# Patient Record
Sex: MALE | Race: WHITE | Employment: FULL TIME | URBAN - NONMETROPOLITAN AREA
[De-identification: names, ages, dates, MRNs, and addresses within clinical notes are randomized per-mention and may not be internally consistent; named-entity substitution may affect disease eponyms.]

---

## 2022-10-22 ENCOUNTER — HOSPITAL ENCOUNTER (EMERGENCY)
Age: 42
Discharge: ANOTHER HOSPITAL | End: 2022-10-22
Attending: EMERGENCY MEDICINE | Admitting: EMERGENCY MEDICINE

## 2022-10-22 ENCOUNTER — APPOINTMENT (OUTPATIENT)
Dept: GENERAL RADIOLOGY | Age: 42
End: 2022-10-22
Attending: EMERGENCY MEDICINE

## 2022-10-22 VITALS
SYSTOLIC BLOOD PRESSURE: 153 MMHG | RESPIRATION RATE: 18 BRPM | OXYGEN SATURATION: 99 % | HEART RATE: 95 BPM | WEIGHT: 190 LBS | HEIGHT: 68 IN | DIASTOLIC BLOOD PRESSURE: 83 MMHG | BODY MASS INDEX: 28.79 KG/M2

## 2022-10-22 DIAGNOSIS — S82.142A TIBIAL PLATEAU FRACTURE, LEFT, CLOSED, INITIAL ENCOUNTER: ICD-10-CM
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
## 2022-10-22 DIAGNOSIS — S83.105A DISLOCATION OF LEFT KNEE, INITIAL ENCOUNTER: ICD-10-CM

## 2022-10-22 LAB
ANION GAP SERPL CALC-SCNC: 10 MMOL/L (ref 3–18)
APTT PPP: 23.3 SEC (ref 23–36.4)
BASOPHILS # BLD: 0.1 K/UL (ref 0–0.1)
BASOPHILS NFR BLD: 1 % (ref 0–2)
BUN SERPL-MCNC: 15 MG/DL (ref 7–18)
BUN/CREAT SERPL: 14 (ref 12–20)
CA-I BLD-MCNC: 8.9 MG/DL (ref 8.5–10.1)
CHLORIDE SERPL-SCNC: 104 MMOL/L (ref 100–111)
CO2 SERPL-SCNC: 27 MMOL/L (ref 21–32)
CREAT SERPL-MCNC: 1.09 MG/DL (ref 0.6–1.3)
DIFFERENTIAL METHOD BLD: ABNORMAL
EOSINOPHIL # BLD: 0.2 K/UL (ref 0–0.4)
EOSINOPHIL NFR BLD: 2 % (ref 0–5)
ERYTHROCYTE [DISTWIDTH] IN BLOOD BY AUTOMATED COUNT: 13.3 % (ref 11.6–14.5)
GLUCOSE SERPL-MCNC: 109 MG/DL (ref 74–99)
HCT VFR BLD AUTO: 45.4 % (ref 36–48)
HGB BLD-MCNC: 15.1 G/DL (ref 13–16)
IMM GRANULOCYTES # BLD AUTO: 0.1 K/UL (ref 0–0.04)
IMM GRANULOCYTES NFR BLD AUTO: 0 % (ref 0–0.5)
INR PPP: 0.9 (ref 0.8–1.2)
LYMPHOCYTES # BLD: 1.3 K/UL (ref 0.9–3.6)
LYMPHOCYTES NFR BLD: 10 % (ref 21–52)
MCH RBC QN AUTO: 30 PG (ref 24–34)
MCHC RBC AUTO-ENTMCNC: 33.3 G/DL (ref 31–37)
MCV RBC AUTO: 90.3 FL (ref 78–100)
MONOCYTES # BLD: 0.8 K/UL (ref 0.05–1.2)
MONOCYTES NFR BLD: 6 % (ref 3–10)
NEUTS SEG # BLD: 11.3 K/UL (ref 1.8–8)
NEUTS SEG NFR BLD: 81 % (ref 40–73)
NRBC # BLD: 0 K/UL (ref 0–0.01)
NRBC BLD-RTO: 0 PER 100 WBC
PLATELET # BLD AUTO: 247 K/UL (ref 135–420)
PMV BLD AUTO: 10 FL (ref 9.2–11.8)
POTASSIUM SERPL-SCNC: 4.3 MMOL/L (ref 3.5–5.5)
PROTHROMBIN TIME: 13 SEC (ref 11.5–15.2)
RBC # BLD AUTO: 5.03 M/UL (ref 4.35–5.65)
SODIUM SERPL-SCNC: 141 MMOL/L (ref 136–145)
THERAPEUTIC RANGE,PTTT: NORMAL SEC (ref 82–109)
WBC # BLD AUTO: 13.7 K/UL (ref 4.6–13.2)

## 2022-10-22 PROCEDURE — 73562 X-RAY EXAM OF KNEE 3: CPT

## 2022-10-22 PROCEDURE — 71045 X-RAY EXAM CHEST 1 VIEW: CPT

## 2022-10-22 PROCEDURE — 85025 COMPLETE CBC W/AUTO DIFF WBC: CPT

## 2022-10-22 PROCEDURE — 96376 TX/PRO/DX INJ SAME DRUG ADON: CPT | Performed by: EMERGENCY MEDICINE

## 2022-10-22 PROCEDURE — 74011250636 HC RX REV CODE- 250/636: Performed by: EMERGENCY MEDICINE

## 2022-10-22 PROCEDURE — 72170 X-RAY EXAM OF PELVIS: CPT

## 2022-10-22 PROCEDURE — 72040 X-RAY EXAM NECK SPINE 2-3 VW: CPT

## 2022-10-22 PROCEDURE — 85610 PROTHROMBIN TIME: CPT

## 2022-10-22 PROCEDURE — 73560 X-RAY EXAM OF KNEE 1 OR 2: CPT

## 2022-10-22 PROCEDURE — 36415 COLL VENOUS BLD VENIPUNCTURE: CPT

## 2022-10-22 PROCEDURE — 80048 BASIC METABOLIC PNL TOTAL CA: CPT

## 2022-10-22 PROCEDURE — 75810000053 HC SPLINT APPLICATION

## 2022-10-22 PROCEDURE — 99285 EMERGENCY DEPT VISIT HI MDM: CPT | Performed by: EMERGENCY MEDICINE

## 2022-10-22 PROCEDURE — 96361 HYDRATE IV INFUSION ADD-ON: CPT | Performed by: EMERGENCY MEDICINE

## 2022-10-22 PROCEDURE — 96374 THER/PROPH/DIAG INJ IV PUSH: CPT | Performed by: EMERGENCY MEDICINE

## 2022-10-22 PROCEDURE — 96375 TX/PRO/DX INJ NEW DRUG ADDON: CPT | Performed by: EMERGENCY MEDICINE

## 2022-10-22 PROCEDURE — 85730 THROMBOPLASTIN TIME PARTIAL: CPT

## 2022-10-22 RX ORDER — HYDROMORPHONE HYDROCHLORIDE 1 MG/ML
2 INJECTION, SOLUTION INTRAMUSCULAR; INTRAVENOUS; SUBCUTANEOUS
Status: COMPLETED | OUTPATIENT
Start: 2022-10-22 | End: 2022-10-22

## 2022-10-22 RX ORDER — ONDANSETRON 2 MG/ML
4 INJECTION INTRAMUSCULAR; INTRAVENOUS
Status: COMPLETED | OUTPATIENT
Start: 2022-10-22 | End: 2022-10-22

## 2022-10-22 RX ORDER — HYDROMORPHONE HYDROCHLORIDE 1 MG/ML
2 INJECTION, SOLUTION INTRAMUSCULAR; INTRAVENOUS; SUBCUTANEOUS ONCE
Status: COMPLETED | OUTPATIENT
Start: 2022-10-22 | End: 2022-10-22

## 2022-10-22 RX ADMIN — SODIUM CHLORIDE 1000 ML: 9 INJECTION, SOLUTION INTRAVENOUS at 12:06

## 2022-10-22 RX ADMIN — HYDROMORPHONE HYDROCHLORIDE 2 MG: 1 INJECTION, SOLUTION INTRAMUSCULAR; INTRAVENOUS; SUBCUTANEOUS at 12:02

## 2022-10-22 RX ADMIN — HYDROMORPHONE HYDROCHLORIDE 2 MG: 1 INJECTION, SOLUTION INTRAMUSCULAR; INTRAVENOUS; SUBCUTANEOUS at 12:28

## 2022-10-22 RX ADMIN — ONDANSETRON 4 MG: 2 INJECTION INTRAMUSCULAR; INTRAVENOUS at 12:02

## 2022-10-22 NOTE — PROGRESS NOTES
Pt states pain is 4/10 at this time now that MD has splinted and stablized knee. Pt's mother and son is present at bedside. Report given to Wanda Grossman at [place] [id number]. Received call from transport center that [facility] is in air.

## 2022-10-22 NOTE — ED PROVIDER NOTES
EMERGENCY DEPARTMENT HISTORY AND PHYSICAL EXAM      Date: 10/22/2022  Patient Name: Cheyenne Loyd    History of Presenting Illness     Chief Complaint   Patient presents with    Knee Injury       History Provided By: Patient and EMS    HPI: Corey Dumont, 38 y.o. male with no significant past medical history, presents to ED, brought in by EMS after a fall while hunting just prior to ED arrival.  Patient fell off 8 foot embankment into a creek. He was unable to to stand. There is no head injury or neck injury. EMS report obvious deformity to the left knee, present but decreased pulses at scene. Patient complaining of severe pain of the knee at arrival.  He denies alcohol or drugs. There are no other complaints, changes, or physical findings at this time. PCP: Mike Webb MD    Current Facility-Administered Medications   Medication Dose Route Frequency Provider Last Rate Last Admin    sodium chloride 0.9 % bolus infusion 1,000 mL  1,000 mL IntraVENous ONCE Brian Lim MD 1,000 mL/hr at 10/22/22 1206 1,000 mL at 10/22/22 1206       Past History   Past Medical History:  History reviewed. No pertinent past medical history. Past Surgical History:  History reviewed. No pertinent surgical history. Family History:  History reviewed. No pertinent family history. Social History:  Social History     Tobacco Use    Smoking status: Some Days     Types: Cigarettes   Substance Use Topics    Alcohol use: Yes     Alcohol/week: 14.0 standard drinks     Types: 14 Cans of beer per week       Allergies:  No Known Allergies  Review of Systems   Review of Systems   All other systems reviewed and are negative. Physical Exam   Physical Exam  Vitals and nursing note reviewed. Constitutional:       General: He is not in acute distress. Appearance: He is well-developed. He is not diaphoretic. Comments: Patient appears in pain, arrived with c-collar on and long spine board.   Obvious deformity 20 which is flexed and left foot laterally deviated. HENT:      Head: Normocephalic and atraumatic. No right periorbital erythema or left periorbital erythema. Jaw: No trismus. Right Ear: External ear normal. No drainage or swelling. Tympanic membrane is not perforated, erythematous or bulging. Left Ear: External ear normal. No drainage or swelling. Tympanic membrane is not perforated, erythematous or bulging. Nose: Nose normal. No mucosal edema or rhinorrhea. Right Sinus: No maxillary sinus tenderness or frontal sinus tenderness. Left Sinus: No maxillary sinus tenderness or frontal sinus tenderness. Mouth/Throat:      Mouth: No oral lesions. Dentition: No dental abscesses. Pharynx: Uvula midline. No oropharyngeal exudate, posterior oropharyngeal erythema or uvula swelling. Tonsils: No tonsillar abscesses. Eyes:      General: No scleral icterus. Right eye: No discharge. Left eye: No discharge. Extraocular Movements: Extraocular movements intact. Conjunctiva/sclera: Conjunctivae normal.      Pupils: Pupils are equal, round, and reactive to light. Cardiovascular:      Rate and Rhythm: Normal rate and regular rhythm. Heart sounds: Normal heart sounds. No murmur heard. No friction rub. No gallop. Pulmonary:      Effort: Pulmonary effort is normal. No tachypnea, accessory muscle usage or respiratory distress. Breath sounds: No decreased breath sounds, wheezing, rhonchi or rales. Abdominal:      General: Bowel sounds are normal. There is no distension. Palpations: Abdomen is soft. Tenderness: There is no abdominal tenderness. Musculoskeletal:         General: No tenderness. Normal range of motion. Cervical back: Normal range of motion and neck supple. Comments: Knee with obvious deformity, seems laterally dislocated. There is minimal swelling. Patient is in position of comfort with flexed knee.   Initially unable to palpate pulse of the left foot. Pulse was obtained by Doppler but is faint. Even with Doppler it seems to be coming on and off. There is coolness to touch of left foot. Lymphadenopathy:      Cervical: No cervical adenopathy. Skin:     General: Skin is warm and dry. Neurological:      General: No focal deficit present. Mental Status: He is alert and oriented to person, place, and time. Mental status is at baseline. Comments: Patient oriented x3. He is able to follow commands. No focal neurological findings. Psychiatric:         Judgment: Judgment normal.       Lab and Diagnostic Study Results   Labs -   No results found for this or any previous visit (from the past 12 hour(s)). Radiologic Studies -   [unfilled]  CT Results  (Last 48 hours)      None          CXR Results  (Last 48 hours)      None            Medical Decision Making and ED Course   Differential Diagnosis & Medical Decision Making Provider Note:       - I am the first and primary provider for this patient. I reviewed the vital signs, available nursing notes, past medical history, past surgical history, family history and social history. The patient's presenting problems have been discussed, and the staff are in agreement with the care plan formulated and outlined with them. I have encouraged them to ask questions as they arise throughout their visit. Vital Signs-Reviewed the patient's vital signs. Patient Vitals for the past 12 hrs:   Pulse Resp SpO2   10/22/22 1150 93 18 100 %       ED Course:   X-ray of the left knee was obtained and there is contamination of the to be applied to with fragments laterally displaced. Unable to reduce knee. Pelvis seems intact. C-spine was cleared with x-rays. IV fluids given initially 1 L, patient required total 4 mg of Dilaudid for pain control. Knee was splinted in position of comfort. Discussed with Rudy watkins for general trauma who accepted patient.   Will get patient to trauma center via ambulance exam concerned about vascular injury to the left leg. Procedures and Critical Care     Performed by: Zhou Mitchell MD  Splint, Long Leg    Date/Time: 10/22/2022 1:02 PM  Performed by: Juventino Jane MD  Authorized by: Juventino Jane MD     Consent:     Consent obtained:  Verbal    Consent given by:  Patient    Risks, benefits, and alternatives were discussed: yes      Risks discussed:  Pain and swelling    Alternatives discussed:  Referral  [place] protocol:     Procedure explained and questions answered to patient or proxy's satisfaction: yes      Relevant documents present and verified: yes      Test results available: yes      Imaging studies available: yes      Required blood products, implants, devices, and special equipment available: yes      Site/side marked: yes      Immediately prior to procedure a time out was called: yes      Patient identity confirmed:  Verbally with patient  Pre-procedure details:     Distal neurologic exam:  Normal    Distal perfusion: distal pulses diminished    Procedure details:     Location:  Knee    Knee location:  L knee    Strapping: yes      Supplies:  Fiberglass    Attestation: Splint applied and adjusted personally by me    Post-procedure details:     Distal neurologic exam:  Unchanged    Distal perfusion: unchanged      Procedure completion:  Tolerated with difficulty    Post-procedure imaging: not applicable        CRITICAL CARE NOTE :    12:53 PM    IMPENDING DETERIORATION -Trauma  ASSOCIATED RISK FACTORS - Trauma  MANAGEMENT- Bedside Assessment  INTERPRETATION -  Xrays  INTERVENTIONS - Transfer  CASE REVIEW - Hospitalist/Intensivist and Medical Sub-Specialist  TREATMENT RESPONSE -Stable  PERFORMED BY - Physician    NOTES   :  I have spent 45 minutes of critical care time involved in lab review, consultations with specialist, family decision- making, bedside attention and documentation.  This time excludes time spent in any separate billed procedures. During this entire length of time I was immediately available to the patient . Eliud Marquis MD    Disposition   Disposition: Transferred to  [facility]  patient verbally agreed to transfer and understand the risks involved as outlined in the EMTALA form. Diagnosis/Clinical Impression     Clinical Impression:   1. Fall, initial encounter    2. Tibial plateau fracture, left, closed, initial encounter    3. Dislocation of left knee, initial encounter        Attestations: ATIF, Eliud Marquis MD, am the primary clinician of record. Please note that this dictation was completed with [employer], the computer voice recognition software. Quite often unanticipated grammatical, syntax, homophones, and other interpretive errors are inadvertently transcribed by the computer software. Please disregard these errors. Please excuse any errors that have escaped final proofreading. Thank you.

## 2022-10-22 NOTE — ED TRIAGE NOTES
Pt fell out of 8 foot enbankment hunting. Obvious deformity to left knee. Pt screaming in pain, doppler used to locate pulses. +DP/PT---faint with doppler but present.  Pt with normal sensation and able to wiggle toes